# Patient Record
Sex: MALE | Race: WHITE | NOT HISPANIC OR LATINO | Employment: UNEMPLOYED | URBAN - METROPOLITAN AREA
[De-identification: names, ages, dates, MRNs, and addresses within clinical notes are randomized per-mention and may not be internally consistent; named-entity substitution may affect disease eponyms.]

---

## 2022-04-12 ENCOUNTER — HOSPITAL ENCOUNTER (EMERGENCY)
Facility: MEDICAL CENTER | Age: 47
End: 2022-04-13
Attending: EMERGENCY MEDICINE
Payer: MEDICAID

## 2022-04-12 DIAGNOSIS — Z76.0 MEDICATION REFILL: ICD-10-CM

## 2022-04-12 LAB
AMPHET UR QL SCN: NEGATIVE
BARBITURATES UR QL SCN: NEGATIVE
BENZODIAZ UR QL SCN: NEGATIVE
BREATH ALCOHOL COMMENT: 0
BZE UR QL SCN: NEGATIVE
CANNABINOIDS UR QL SCN: POSITIVE
METHADONE UR QL SCN: NEGATIVE
OPIATES UR QL SCN: NEGATIVE
OXYCODONE UR QL SCN: NEGATIVE
PCP UR QL SCN: NEGATIVE
POC BREATHALIZER: 0 PERCENT (ref 0–0.01)
PROPOXYPH UR QL SCN: NEGATIVE

## 2022-04-12 PROCEDURE — 80307 DRUG TEST PRSMV CHEM ANLYZR: CPT

## 2022-04-12 PROCEDURE — 99284 EMERGENCY DEPT VISIT MOD MDM: CPT

## 2022-04-12 RX ORDER — TRAZODONE HYDROCHLORIDE 50 MG/1
50 TABLET ORAL NIGHTLY
COMMUNITY

## 2022-04-12 RX ORDER — CLONAZEPAM 1 MG/1
1 TABLET ORAL 3 TIMES DAILY
Status: SHIPPED | COMMUNITY
End: 2022-04-13 | Stop reason: SDUPTHER

## 2022-04-12 RX ORDER — DULOXETIN HYDROCHLORIDE 60 MG/1
60 CAPSULE, DELAYED RELEASE ORAL 2 TIMES DAILY
Status: SHIPPED | COMMUNITY
End: 2022-04-13 | Stop reason: SDUPTHER

## 2022-04-13 VITALS
WEIGHT: 200 LBS | DIASTOLIC BLOOD PRESSURE: 78 MMHG | BODY MASS INDEX: 30.31 KG/M2 | HEIGHT: 68 IN | RESPIRATION RATE: 15 BRPM | SYSTOLIC BLOOD PRESSURE: 119 MMHG | OXYGEN SATURATION: 98 % | HEART RATE: 78 BPM | TEMPERATURE: 97.6 F

## 2022-04-13 PROCEDURE — 700102 HCHG RX REV CODE 250 W/ 637 OVERRIDE(OP): Performed by: EMERGENCY MEDICINE

## 2022-04-13 PROCEDURE — A9270 NON-COVERED ITEM OR SERVICE: HCPCS | Performed by: EMERGENCY MEDICINE

## 2022-04-13 RX ORDER — CLONAZEPAM 1 MG/1
1 TABLET ORAL 3 TIMES DAILY
Qty: 45 TABLET | Refills: 0 | Status: SHIPPED | OUTPATIENT
Start: 2022-04-13 | End: 2022-04-28

## 2022-04-13 RX ORDER — DULOXETIN HYDROCHLORIDE 60 MG/1
60 CAPSULE, DELAYED RELEASE ORAL 2 TIMES DAILY
Qty: 30 CAPSULE | Refills: 0 | Status: SHIPPED | OUTPATIENT
Start: 2022-04-13 | End: 2022-04-28

## 2022-04-13 RX ORDER — CLONAZEPAM 0.5 MG/1
1 TABLET ORAL ONCE
Status: COMPLETED | OUTPATIENT
Start: 2022-04-13 | End: 2022-04-13

## 2022-04-13 RX ORDER — QUETIAPINE FUMARATE 50 MG/1
50 TABLET, FILM COATED ORAL DAILY
Qty: 60 TABLET | Refills: 0 | Status: SHIPPED | OUTPATIENT
Start: 2022-04-13 | End: 2022-04-28

## 2022-04-13 RX ORDER — QUETIAPINE FUMARATE 50 MG/1
50 TABLET, FILM COATED ORAL DAILY
Status: SHIPPED | COMMUNITY
End: 2022-04-13 | Stop reason: SDUPTHER

## 2022-04-13 RX ADMIN — CLONAZEPAM 1 MG: 0.5 TABLET ORAL at 02:00

## 2022-04-13 NOTE — ED NOTES
MD at bedside.     Patient vital signs rechecked and documented per [facility]. Patient denies any new needs at this time. Patient is up to date on poc

## 2022-04-13 NOTE — ED NOTES
Med rec completed per patient  Allergies reviewed  No PO Antibiotics in the last 30 days     Patient says he has not had any medications in 4 days. His last prescriptions came from a [facility] in [place], no local pharmacy established yet.

## 2022-04-13 NOTE — ED NOTES
Pt flags low risk on suicide screening for ongoing thoughts of suicide without plan or intention to act on thoughts. Charge RN made aware. Report to Trena. Pt roomed
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
## 2022-04-13 NOTE — ED TRIAGE NOTES
"Chief Complaint   Patient presents with   • Psych Eval     Pt states he is new to town and wants to speak to a mental health professional about getting restarted on his medications. States \"things are wrong\" and feelings of confusion, rage and anger. States complex psych history      Pt ambulatory w/ cane to triage for above complaints. Unwilling to articulate specifics of mental health history but repeats that he wants to speak to a mental health professional.         "
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
## 2022-04-13 NOTE — ED NOTES
Break RN: Pt medicated per MAR. Discharge teaching with paperwork regarding provided to pt. Pt verbalized understanding of teaching and all questions answered. Pt is A&Ox4 with stable vital  signs, stable physical assessment, and PIV removed upon discharge. Pt ambulatory out of ED with steady gait with all personal belongings.

## 2022-04-13 NOTE — ED NOTES
Patient able to walk to and from restroom with steady gate at this time. Urine sample sent to lab     Breathalyzer completed .000

## 2022-04-19 ENCOUNTER — HOSPITAL ENCOUNTER (EMERGENCY)
Facility: MEDICAL CENTER | Age: 47
End: 2022-04-19
Attending: STUDENT IN AN ORGANIZED HEALTH CARE EDUCATION/TRAINING PROGRAM
Payer: MEDICAID

## 2022-04-19 VITALS
RESPIRATION RATE: 17 BRPM | BODY MASS INDEX: 30.31 KG/M2 | SYSTOLIC BLOOD PRESSURE: 140 MMHG | DIASTOLIC BLOOD PRESSURE: 85 MMHG | HEART RATE: 81 BPM | TEMPERATURE: 97.7 F | OXYGEN SATURATION: 97 % | WEIGHT: 200 LBS | HEIGHT: 68 IN
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
## 2022-04-19 DIAGNOSIS — R53.1 WEAKNESS: ICD-10-CM

## 2022-04-19 DIAGNOSIS — M54.32 SCIATICA OF LEFT SIDE: ICD-10-CM

## 2022-04-19 PROCEDURE — 99284 EMERGENCY DEPT VISIT MOD MDM: CPT

## 2022-04-19 RX ORDER — DULOXETIN HYDROCHLORIDE 60 MG/1
60 CAPSULE, DELAYED RELEASE ORAL DAILY
Qty: 30 CAPSULE | Refills: 0 | Status: SHIPPED | OUTPATIENT
Start: 2022-04-19

## 2022-04-19 RX ORDER — LIDOCAINE 50 MG/G
1 PATCH TOPICAL EVERY 24 HOURS
Qty: 10 PATCH | Refills: 0 | Status: SHIPPED | OUTPATIENT
Start: 2022-04-19

## 2022-04-19 RX ORDER — ACETAMINOPHEN 500 MG
500-1000 TABLET ORAL EVERY 6 HOURS PRN
Qty: 30 TABLET | Refills: 0 | Status: SHIPPED | OUTPATIENT
Start: 2022-04-19 | End: 2023-03-30

## 2022-04-19 RX ORDER — IBUPROFEN 600 MG/1
600 TABLET ORAL EVERY 6 HOURS PRN
Qty: 30 TABLET | Refills: 0 | Status: SHIPPED | OUTPATIENT
Start: 2022-04-19
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
## 2022-04-20 NOTE — DISCHARGE INSTRUCTIONS
If you develop any urinary tension loss of bowel or bladder control numbness in your groin return for recheck take the medication as previously prescribed follow-up with internal medicine return with other concerns.

## 2022-04-20 NOTE — ED TRIAGE NOTES
"Juanjo Marcos August  46 y.o.  male  Chief Complaint   Patient presents with   • Weakness     Brought in by [facility] from [facility] c/o bilateral lower leg weakness with numbness and tingling, described as \" pins and needles\" after a fall today around 6 pm. Hx of Chronic back pain. Denies taking blood thinners. Denies hitting head.       "

## 2022-06-12 ENCOUNTER — HOSPITAL ENCOUNTER (EMERGENCY)
Facility: MEDICAL CENTER | Age: 47
End: 2022-06-12
Attending: EMERGENCY MEDICINE
Payer: MEDICAID

## 2022-06-12 VITALS
OXYGEN SATURATION: 98 % | WEIGHT: 210.76 LBS | RESPIRATION RATE: 16 BRPM | BODY MASS INDEX: 31.94 KG/M2 | DIASTOLIC BLOOD PRESSURE: 89 MMHG | TEMPERATURE: 98 F | HEIGHT: 68 IN | HEART RATE: 70 BPM | SYSTOLIC BLOOD PRESSURE: 115 MMHG

## 2022-06-12 DIAGNOSIS — L03.011 PARONYCHIA OF RIGHT LITTLE FINGER: ICD-10-CM

## 2022-06-12 DIAGNOSIS — Z51.89 ENCOUNTER FOR WOUND RE-CHECK: ICD-10-CM

## 2022-06-12 DIAGNOSIS — L02.91 ABSCESS: ICD-10-CM

## 2022-06-12 PROCEDURE — 99283 EMERGENCY DEPT VISIT LOW MDM: CPT

## 2022-06-12 PROCEDURE — 700102 HCHG RX REV CODE 250 W/ 637 OVERRIDE(OP): Performed by: EMERGENCY MEDICINE

## 2022-06-12 PROCEDURE — 700111 HCHG RX REV CODE 636 W/ 250 OVERRIDE (IP): Performed by: EMERGENCY MEDICINE

## 2022-06-12 PROCEDURE — 90471 IMMUNIZATION ADMIN: CPT

## 2022-06-12 PROCEDURE — 90715 TDAP VACCINE 7 YRS/> IM: CPT | Performed by: EMERGENCY MEDICINE

## 2022-06-12 PROCEDURE — A9270 NON-COVERED ITEM OR SERVICE: HCPCS | Performed by: EMERGENCY MEDICINE

## 2022-06-12 PROCEDURE — 700101 HCHG RX REV CODE 250: Performed by: EMERGENCY MEDICINE

## 2022-06-12 PROCEDURE — 303977 HCHG I & D

## 2022-06-12 RX ORDER — SULFAMETHOXAZOLE AND TRIMETHOPRIM 800; 160 MG/1; MG/1
1 TABLET ORAL 2 TIMES DAILY
Qty: 20 TABLET | Refills: 0 | Status: SHIPPED | OUTPATIENT
Start: 2022-06-12 | End: 2022-06-22

## 2022-06-12 RX ORDER — LIDOCAINE HYDROCHLORIDE 20 MG/ML
20 INJECTION, SOLUTION INFILTRATION; PERINEURAL ONCE
Status: COMPLETED | OUTPATIENT
Start: 2022-06-12 | End: 2022-06-12

## 2022-06-12 RX ORDER — SULFAMETHOXAZOLE AND TRIMETHOPRIM 800; 160 MG/1; MG/1
1 TABLET ORAL ONCE
Status: COMPLETED | OUTPATIENT
Start: 2022-06-12 | End: 2022-06-12

## 2022-06-12 RX ORDER — SULFAMETHOXAZOLE AND TRIMETHOPRIM 800; 160 MG/1; MG/1
1 TABLET ORAL 2 TIMES DAILY
Qty: 20 TABLET | Refills: 0 | Status: SHIPPED | OUTPATIENT
Start: 2022-06-12 | End: 2022-06-12 | Stop reason: SDUPTHER

## 2022-06-12 RX ORDER — AMOXICILLIN 500 MG/1
500 CAPSULE ORAL 3 TIMES DAILY
Qty: 30 CAPSULE | Refills: 0 | Status: SHIPPED | OUTPATIENT
Start: 2022-06-12 | End: 2023-03-30

## 2022-06-12 RX ORDER — AMOXICILLIN 500 MG/1
500 CAPSULE ORAL ONCE
Status: COMPLETED | OUTPATIENT
Start: 2022-06-12 | End: 2022-06-12

## 2022-06-12 RX ORDER — AMOXICILLIN 500 MG/1
500 CAPSULE ORAL 3 TIMES DAILY
Qty: 30 CAPSULE | Refills: 0 | Status: SHIPPED | OUTPATIENT
Start: 2022-06-12 | End: 2022-06-12 | Stop reason: SDUPTHER

## 2022-06-12 RX ADMIN — SULFAMETHOXAZOLE AND TRIMETHOPRIM 1 TABLET: 800; 160 TABLET ORAL at 16:44

## 2022-06-12 RX ADMIN — AMOXICILLIN 500 MG: 500 CAPSULE ORAL at 16:44

## 2022-06-12 RX ADMIN — LIDOCAINE HYDROCHLORIDE 20 ML: 20 INJECTION, SOLUTION INFILTRATION; PERINEURAL at 16:45

## 2022-06-12 RX ADMIN — CLOSTRIDIUM TETANI TOXOID ANTIGEN (FORMALDEHYDE INACTIVATED), CORYNEBACTERIUM DIPHTHERIAE TOXOID ANTIGEN (FORMALDEHYDE INACTIVATED), BORDETELLA PERTUSSIS TOXOID ANTIGEN (GLUTARALDEHYDE INACTIVATED), BORDETELLA PERTUSSIS FILAMENTOUS HEMAGGLUTININ ANTIGEN (FORMALDEHYDE INACTIVATED), BORDETELLA PERTUSSIS PERTACTIN ANTIGEN, AND BORDETELLA PERTUSSIS FIMBRIAE 2/3 ANTIGEN 0.5 ML: 5; 2; 2.5; 5; 3; 5 INJECTION, SUSPENSION INTRAMUSCULAR at 16:44

## 2022-06-12 ASSESSMENT — ENCOUNTER SYMPTOMS: NECK PAIN: 1

## 2022-06-12 NOTE — ED TRIAGE NOTES
Juanjo Gerber August  46 y.o. male  Chief Complaint   Patient presents with   • Wound Check     Wound on dorsal neck from one week ago, red with broken skin, possible bug bite, pt lives in homeless shelter  Wound on right distal pinky is swollen and appears pus filled. Onset yesterday     Pt ambulatory to triage with steady walker gait for above complaint.     Pt is alert and oriented, speaking in full sentences, follows commands and responds appropriately to questions. Resp are even and unlabored. No behavioral indicators of pain.     Pt placed in lobby. Pt educated on triage process. Pt encouraged to alert staff for any changes. This RN masked and in appropriate PPE during encounter.     Vitals:    06/12/22 1342   BP: 134/80   Pulse: 74   Resp: 16   Temp: 36 °C (96.8 °F)   SpO2: 97%

## 2022-06-12 NOTE — ED PROVIDER NOTES
"ED Provider Note    Scribed for Jeronimo Matt M.D. by Red Amor. 6/12/2022, 4:16 PM.    Primary care provider: No primary care provider noted.  Means of arrival: Walk-in  History obtained from: Patient  History limited by: None    CHIEF COMPLAINT  Chief Complaint   Patient presents with   • Wound Check     Wound on dorsal neck from one week ago, red with broken skin, possible bug bite, pt lives in homeless shelter  Wound on right distal pinky is swollen and appears pus filled. Onset yesterday       HPI  Juanjo Andreson is a 46 y.o. male who presents to the Emergency Department for a wound check. He has a wound to the back onset 1 week ago. He presents today for a wound to the right fifth finger onset yesterday. He describes his pain as burning. He states that they feel like they are \"on fire\". He has associated right finger swelling, right finger pain, and neck pain. He denies any fevers or chills.  He takes an unspecified psychiatric medication daily. He does not know if his tetanus is up to date. He is allergic to codeine. He lives in a homeless shelter.     REVIEW OF SYSTEMS  Review of Systems   Musculoskeletal: Positive for neck pain.        Positive for right finger pain, and right finger swelling.       PAST MEDICAL HISTORY   has a past medical history of Chronic back pain, Diabetes insipidus (HCC), and Psychiatric disorder.    SURGICAL HISTORY  patient denies any surgical history    SOCIAL HISTORY  Social History     Tobacco Use   • Smoking status: Current Every Day Smoker     Packs/day: 1.00     Types: Cigarettes   • Smokeless tobacco: Never Used   Vaping Use   • Vaping Use: Never used   Substance Use Topics   • Alcohol use: Never   • Drug use: Yes     Types: Inhaled     Comment: marijuana      Social History     Substance and Sexual Activity   Drug Use Yes   • Types: Inhaled    Comment: marijuana       FAMILY HISTORY  History reviewed. No pertinent family history.    CURRENT " "MEDICATIONS  Home Medications     Reviewed by Martin Abreu R.N. (Registered Nurse) on 06/12/22 at 1400  Med List Status: Partial   Medication Last Dose Status   acetaminophen (TYLENOL) 500 MG Tab  Active   DULoxetine (CYMBALTA) 60 MG Cap DR Particles delayed-release capsule  Active   ibuprofen (MOTRIN) 600 MG Tab  Active   lidocaine (LIDODERM) 5 % Patch  Active   traZODone (DESYREL) 50 MG Tab  Active                ALLERGIES  Allergies   Allergen Reactions   • Codeine      Chain vomiting       PHYSICAL EXAM  VITAL SIGNS: BP (!) 117/93   Pulse 71   Temp 36.9 °C (98.5 °F) (Temporal)   Resp 19   Ht 1.727 m (5' 8\")   Wt 95.6 kg (210 lb 12.2 oz)   SpO2 97%   BMI 32.05 kg/m²   Vitals reviewed.  Constitutional: Well developed, Well nourished, No acute distress, Non-toxic appearance.   HENT: Normocephalic, Atraumatic, Bilateral external ears normal, Oropharynx moist, No oral exudates, Nose normal.   Eyes: PERRL, EOMI, Conjunctiva normal, No discharge.   Neck: Normal range of motion, on the back of the neck and the base of the skull he has 3 discrete lesions of which have some honey crusting and some evidence of drainage.  There is no significant cellulitis.  These do not look acute.  This could be part of a larger carbuncle but there is no underlying fluctuance.    Cardiovascular: Normal heart rate, Normal rhythm, No murmurs, No rubs, No gallops.   Thorax & Lungs: Normal breath sounds, No respiratory distress, No wheezing, No chest tenderness.   Abdomen: Bowel sounds normal, Soft, No tenderness  Skin: Warm, Dry. Lesions to the back of the neck and right fifth finger.  Back: No tenderness, No CVA tenderness.   Musculoskeletal: Good range of motion in all major joints.  Right fifth finger has a paronychia on the lateral aspect.  Neurologic: Alert,, No focal deficits noted.   Psychiatric: Affect normal    PROCEDURES    Incision and Drainage Procedure Note    Indication: Paronychia    Procedure: The patient was " positioned appropriately and the skin over the incision site was prepped with betadine and draped in a sterile fashion. Local anesthesia was obtained with a full digital block of the right short (pinkie) finger using 2% Lidocaine without epinephrine.  An incision was then made over the apex of the lesion and approximately 2 cc of thick and tenacious material was expressed. Loculations were broken up using forceps and more of the material was able to be expressed. The drainage cavity was then dressed with bacitracin. The patient’s tetanus status was updated with a tetanus booster.    The patient tolerated the procedure well.    Complications: None      COURSE & MEDICAL DECISION MAKING  Pertinent Labs & Imaging studies reviewed. (See chart for details)        4:16 PM Patient seen and examined at bedside. The patient presents with a wound to the right finger and neck, and the differential diagnosis includes but is not limited to paronychia, chronic wounds to the back of his neck, possible carbuncle.. Patient will be treated with Amoxil 500 mg, Bactrim 1 tablet, and Adacel 0.5 mL for his symptoms.      5:06 PM Performed incision and drainage procedure. See details above. Discussed discharge instructions and return precautions with the patient and they were cleared for discharge. Patient was given the opportunity to ask any further questions. He is comfortable with discharge at this time.      The patient's paronychia is drained and then a digital block was performed and further incision drainage performed with additional purulent drainage.  This may continue to worsen we will put him on oral antibiotics for purulent skin and soft tissue infection.    The patient will have his wounds to the back of his neck cleaned and wet-to-dry dressings placed.  He is advised follow-up primary care.  He has no signs of sepsis or significant infection.  He will be discharged with return precautions and follow-up.  Questions were  answered agreeable to plan and discharged in good condition.    The patient will return for new or worsening symptoms and is stable at the time of discharge.    The patient is referred to a primary physician for blood pressure management, diabetic screening, and for all other preventative health concerns.    DISPOSITION:  Patient will be discharged home in stable condition.    OUTPATIENT MEDICATIONS:  New Prescriptions    AMOXICILLIN (AMOXIL) 500 MG CAP    Take 1 Capsule by mouth 3 times a day.    SULFAMETHOXAZOLE-TRIMETHOPRIM (BACTRIM DS) 800-160 MG TABLET    Take 1 Tablet by mouth 2 times a day for 10 days.        FINAL IMPRESSION  1. Encounter for wound re-check    2. Abscess    3. Paronychia of right little finger          Red UP (Scribe), am scribing for, and in the presence of, Jeronimo Matt M.D..    Electronically signed by: Red Amor (Scribe), 6/12/2022    Jeronimo UP M.D. personally performed the services described in this documentation, as scribed by Red Amor in my presence, and it is both accurate and complete.    The note accurately reflects work and decisions made by me.  Jeronimo Matt M.D.  6/12/2022  9:36 PM    NITIN

## 2022-06-13 NOTE — ED NOTES
All discharge instructions given to pt.   Pt verbalized understanding of all discharge instructions.  All questions answered.

## 2022-06-13 NOTE — DISCHARGE INSTRUCTIONS
Antibiotics as prescribed.  Return for pain, swelling, redness, fever or other concerns.  Follow-up with your doctor.  Wound check in 48 hours here or your doctor.  Return sooner for signs described above.  Keep wounds clean and dry and change dressing daily.

## 2023-03-30 ENCOUNTER — HOSPITAL ENCOUNTER (EMERGENCY)
Facility: MEDICAL CENTER | Age: 48
End: 2023-03-30
Attending: EMERGENCY MEDICINE
Payer: COMMERCIAL

## 2023-03-30 VITALS
BODY MASS INDEX: 31.78 KG/M2 | DIASTOLIC BLOOD PRESSURE: 80 MMHG | WEIGHT: 209 LBS | RESPIRATION RATE: 15 BRPM | SYSTOLIC BLOOD PRESSURE: 127 MMHG | HEART RATE: 73 BPM | OXYGEN SATURATION: 99 % | TEMPERATURE: 98.1 F

## 2023-03-30 DIAGNOSIS — M54.32 SCIATICA OF LEFT SIDE: ICD-10-CM

## 2023-03-30 DIAGNOSIS — Z76.0 MEDICATION REFILL: ICD-10-CM

## 2023-03-30 DIAGNOSIS — F41.9 ANXIETY: ICD-10-CM

## 2023-03-30 PROCEDURE — 99284 EMERGENCY DEPT VISIT MOD MDM: CPT

## 2023-03-30 RX ORDER — DULOXETIN HYDROCHLORIDE 60 MG/1
60 CAPSULE, DELAYED RELEASE ORAL DAILY
Qty: 30 CAPSULE | Refills: 0 | Status: SHIPPED | OUTPATIENT
Start: 2023-03-30

## 2023-03-30 RX ORDER — HYDROXYZINE HYDROCHLORIDE 25 MG/1
25 TABLET, FILM COATED ORAL 3 TIMES DAILY PRN
Qty: 90 TABLET | Refills: 0 | Status: SHIPPED | OUTPATIENT
Start: 2023-03-30

## 2023-03-30 NOTE — ED NOTES
Pt ambulated with a normal, steady gait with his personal walker to the room from the lobby. Agree with triage note. Chart up for ERP.

## 2023-03-30 NOTE — ED TRIAGE NOTES
Pt to triage .  Chief Complaint   Patient presents with    Medication Refill     Pt requesting medication refill ran out 8 days ago

## 2023-03-30 NOTE — ED PROVIDER NOTES
ER Provider Note    Scribed for Bentley Conley M.d. by Noe Carlson. 3/30/2023  11:58 AM    Primary Care Provider: No primary care provider noted.    CHIEF COMPLAINT  Chief Complaint   Patient presents with    Medication Refill     Pt requesting medication refill ran out 8 days ago      LIMITATION TO HISTORY   None    HPI/ROS  OUTSIDE HISTORIAN(S):  Reviewed the electronic medical record, reviewed care everywhere, reviewed patient's prescription history    EXTERNAL RECORDS REVIEWED  Outpatient Notes The patient was last seen by his PCP at [facility] on 5/2/22 to establish psychiatric care and housing resources.    Juanjo Anderson is a 47 y.o. male with a history of psychiatric disorder and psoriatic arthritis, who presents to the ED for a medication refill onset prior to arrival. The patient states that he ran out of his previously prescribed Hydroxyzine and Cymbalta 8 days ago. He reports attempting to follow up with [facility] for these refills, but has been unable to make his appointments secondary to transportation issues. The patient was ultimately prompted to visit the ED to fill his medications. Associated symptoms include increased anxiety. He denies any fever. No alleviating or exacerbating factors noted.      PAST MEDICAL HISTORY  Past Medical History:   Diagnosis Date    Chronic back pain     Diabetes insipidus (HCC)     Psychiatric disorder        SURGICAL HISTORY  History reviewed. No pertinent surgical history.    FAMILY HISTORY  No family history noted.    SOCIAL HISTORY   reports that he has been smoking cigarettes. He has been smoking an average of 1 pack per day. He has never used smokeless tobacco. He reports current drug use. Drug: Inhaled. He reports that he does not drink alcohol.    CURRENT MEDICATIONS  Previous Medications    DULOXETINE (CYMBALTA) 60 MG CAP DR PARTICLES DELAYED-RELEASE CAPSULE    Take 1 Capsule by mouth every day.    IBUPROFEN (MOTRIN)  600 MG TAB    Take 1 Tablet by mouth every 6 hours as needed.    LIDOCAINE (LIDODERM) 5 % PATCH    Place 1 Patch on the skin every 24 hours.    TRAZODONE (DESYREL) 50 MG TAB    Take 50 mg by mouth every evening.       ALLERGIES  Codeine    PHYSICAL EXAM  /69   Pulse 72   Temp 36.7 °C (98 °F) (Temporal)   Resp 14   Wt 94.8 kg (208 lb 15.9 oz)   SpO2 99%   BMI 31.78 kg/m²     Nursing note and vitals reviewed.  Constitutional: No distress.   HENT: Head is atraumatic. Oropharynx is moist.   Eyes: Conjunctivae are normal. Pupils are equal, round, and reactive to light.   Cardiovascular: Normal peripheral perfusion  Respiratory: No respiratory distress.   Musculoskeletal: Normal range of motion.   Neurological: Alert. No focal deficits noted.    Skin: Psoriatic plaques noted.   Psych: anxious appearing, slightly agitated.     COURSE & MEDICAL DECISION MAKING    ED Observation Status? No; Patient does not meet criteria for ED Observation.     INITIAL ASSESSMENT AND PLAN  Care Narrative:       11:58 AM - Patient seen and evaluated at bedside. Juanjo Anderson is a 47 y.o. male with a history of psychiatric disorder and psoriatic arthritis, who presents for a medication refill. The patient will be provided with prescriptions for Cymbalta and Hydroxyzine. Discussed plan for discharge; I advised the patient to follow-up with [facility] as needed for primary care concerns, and to return to the [facility] ED with any new or worsening symptoms. Patient was given the opportunity for questions. I addressed all questions or concerns at this time and they verbalize agreement to the plan of care.                  DISPOSITION AND DISCUSSIONS  I have discussed management of the patient with the following physicians and RUDDY's: None    Discussion of management with other [facility] or appropriate source(s): None     Barriers to care at this time, including but not limited to: Patient is homeless, Patient lacks  transportation , Patient had difficult affording medications, and Patient lacks financial resources.     Decision tools and prescription drugs considered including, but not limited to: Medication modification however the patient feels that his current medication regimen is effective and would simply like to continue with current medications and dosing .    The patient will return for new or worsening symptoms and is stable at the time of discharge.    The patient is referred to a primary physician for blood pressure management, diabetic screening, and for all other preventative health concerns.    DISPOSITION:  Patient will be discharged home in stable condition.    FOLLOW UP:  [facility] Emergency Dept  [address] [address] [phone]    If symptoms worsen    [place] [id number] [facility] [id number]  [phone]        OUTPATIENT MEDICATIONS:  New Prescriptions    DULOXETINE (CYMBALTA) 60 MG CAP DR PARTICLES DELAYED-RELEASE CAPSULE    Take 1 Capsule by mouth every day.    HYDROXYZINE HCL (ATARAX) 25 MG TAB    Take 1 Tablet by mouth 3 times a day as needed for Anxiety.     FINAL IMPRESSION   1. Medication refill    2. Anxiety    3. Sciatica of left side         Noe UP (Denny), am scribing for, and in the presence of, Bentley Conley M.D..    Electronically signed by: Noe Carlson (Denny), 3/30/2023    Bentley UP M.D. personally performed the services described in this documentation, as scribed by Noe Carlson in my presence, and it is both accurate and complete.    The note accurately reflects work and decisions made by me.  Bentley Conley M.D.  3/30/2023  2:10 PM

## 2023-03-30 NOTE — ED NOTES
Pt is discharged. VSS. Paperwork explained to pt by ERP and all questions answered. All belongings sent with pt upon departure. Pt ambulatory with a steady gait with assistance of personal walker, out of ED.

## 2023-05-03 ENCOUNTER — HOSPITAL ENCOUNTER (EMERGENCY)
Facility: MEDICAL CENTER | Age: 48
End: 2023-05-03
Payer: COMMERCIAL

## 2023-05-03 VITALS
HEIGHT: 68 IN | TEMPERATURE: 98.4 F | OXYGEN SATURATION: 96 % | SYSTOLIC BLOOD PRESSURE: 98 MMHG | RESPIRATION RATE: 14 BRPM | DIASTOLIC BLOOD PRESSURE: 68 MMHG | BODY MASS INDEX: 31.52 KG/M2 | HEART RATE: 78 BPM | WEIGHT: 208 LBS

## 2023-05-03 PROCEDURE — 302449 STATCHG TRIAGE ONLY (STATISTIC)

## 2023-05-04 NOTE — ED TRIAGE NOTES
Pt stating for approx 3 months he has been having trouble ambulating. Pt was seen at [facility] and discharged to follow up. Pt stating he could not follow up. Pt stating since then he been becoming more weak and is having a hard time bearing weight

## 2023-05-04 NOTE — ED NOTES
Patient called to be roomed - not in lounge, not in senior lounge, not in lobby bathroom, will attempt to call again in 5mins.

## 2023-05-20 ENCOUNTER — HOSPITAL ENCOUNTER (EMERGENCY)
Facility: MEDICAL CENTER | Age: 48
End: 2023-05-21
Attending: EMERGENCY MEDICINE
Payer: COMMERCIAL

## 2023-05-20 DIAGNOSIS — Z51.89 ENCOUNTER FOR WOUND RE-CHECK: ICD-10-CM

## 2023-05-20 PROCEDURE — 99282 EMERGENCY DEPT VISIT SF MDM: CPT

## 2023-05-20 ASSESSMENT — FIBROSIS 4 INDEX: FIB4 SCORE: 0.66

## 2023-05-21 VITALS
HEIGHT: 67 IN | OXYGEN SATURATION: 98 % | HEART RATE: 84 BPM | DIASTOLIC BLOOD PRESSURE: 68 MMHG | SYSTOLIC BLOOD PRESSURE: 114 MMHG | BODY MASS INDEX: 31.39 KG/M2 | WEIGHT: 200 LBS | RESPIRATION RATE: 18 BRPM | TEMPERATURE: 97.8 F

## 2023-05-21 NOTE — ED PROVIDER NOTES
"ED Provider Note    CHIEF COMPLAINT  Chief Complaint   Patient presents with    Wound Check     PATIENT WITH SPINAL SURGERY Monday AND IS HERE FOR A WOUND CHECK. PATIENT STATES HE WAS AT REHAB AND HE LEFT DUE TO POOR CARE. PATIENT STATES HE DOES NOT CURRENTLY THINK HE HAS INFECTION BUT IS HERE FOR WOUND CARE/ WOUND CHECK.        HPI  Juanjo Anderson is a 47 y.o. male who presents ration of \"wound care\" after leaving rehab.  Patient had surgery on Monday and just recently got to rehab but he states that \"I never made it out of the hallway\" and he felt the care there was suboptimal so he left.  He notes he has no dressing changes and no medications.  He notes no pain to the wound area and states he has been walking without difficulty which is the first time that has happened in quite some time so he feels the surgery was quite successful.  He has had no fevers or chills and no loss of bowel or bladder control.  EXTERNAL RECORDS REVIEWED  Reviewed hospital counter at [facility] including surgery on his back from the seventh to the 19th.  Patient had weakness of both lower extremities with spinal stenosis and neurogenic claudication.  ROS  Constitutional: No fevers or chills  Skin: No rashes  HEENT: No sore throat, runny nose  Neck: No neck pain  Chest: No pain or rashes  Pulm: No shortness of breath, cough, wheezing, stridor, or pain with inspiration/expiration  Gastrointestinal: No nausea, vomiting, diarrhea,  or abdominal pain.  Genitourinary: No dysuria or hematuria  Musculoskeletal: No pain, swelling, or lower extremity weakness  Neurologic: No sensory or focal motor changes to extremities. No confusion or disorientation.  Heme: No bleeding or bruising problems.   Immuno: No hx of recurrent infections        LIMITATION TO HISTORY   Select: : None  OUTSIDE HISTORIAN(S):  None        PAST FAM HISTORY  No family history on file.    PAST MEDICAL HISTORY   has a past medical history of Chronic back pain, Diabetes " "insipidus (HCC), and Psychiatric disorder.    SOCIAL HISTORY  Social History     Tobacco Use    Smoking status: Every Day     Packs/day: 1.00     Types: Cigarettes    Smokeless tobacco: Never   Vaping Use    Vaping Use: Never used   Substance and Sexual Activity    Alcohol use: Never    Drug use: Yes     Types: Inhaled     Comment: marijuana    Sexual activity: Not on file       SURGICAL HISTORY  patient denies any surgical history    CURRENT MEDICATIONS  Home Medications    **Home medications have not yet been reviewed for this encounter**          ALLERGIES  Allergies   Allergen Reactions    Codeine      Chain vomiting       PHYSICAL EXAM  VITAL SIGNS: /70   Pulse 89   Temp 35.9 °C (96.7 °F) (Temporal)   Resp 18   Ht 1.702 m (5' 7\")   Wt 90.7 kg (200 lb)   SpO2 96%   BMI 31.32 kg/m²    Gen: Alert in no apparent distress.  HEENT: No signs of trauma, Bilateral external ears normal, Nose normal. Conjunctiva normal, Non-icteric.   Cardiovascular: Regular rate and rhythm, no murmurs.  Capillary refill less than 3 seconds to all extremities, 2+ distal pulses.  Thorax & Lungs: Normal breath sounds, No respiratory distress, No wheezing bilateral chest rise  Abdomen: Bowel sounds normal, Soft, No tenderness  Skin: Warm, Dry  Back: No bony tenderness, No CVA tenderness.  Wound edges are well approximated, there is no active drainage or foul smell.  There is no particular tenderness to the alicia-incisional area.  Staples are intact.  Extremities: Intact distal pulses, No edema  Neurologic: Alert , no facial droop, grossly normal coordination and strength.  To stand and ambulate without apparent difficulty or distress.  Psychiatric: Affect pleasant        COURSE & MEDICAL DECISION MAKING  Pertinent Labs & Imaging studies reviewed. (See chart for details)  ED observation? No  Patient arrives after leaving his postsurgical rehab facility because he did not feel he was adequately being taken care of.  He does not " "have any specific complaints but is asking for \"wound care\" as he does not have any wound care now.  Patient's dressing was stuck to his shirt and no longer on his wound.  He notes he has no pain to the wound and has been walking without difficulty which she states is because the surgery was successful.  He gives no symptoms or symptoms to suggest an infectious issue post operatively, and has no neurologic symptoms now.  He is asking for food and dressing change supplies.  I do not feel any labs or imaging are necessary emergently but I will attempt to get him dressing change supplies and asked him to follow-up closely with his surgeon.      I have discussed management of the patient with the following physicians and RUDDY's: None    Escalation of care considered, and ultimately not performed: None    Barriers to care at this time, including but not limited to: Patient is homeless and does not have a primary care physician.  He has no means of transportation.    Decision tools and Rx drugs considered including, but not limited to : None    Discussion of management with other [facility] or appropriate source(s): None  The patient will return for worsening symptoms and is stable at the time of discharge. The patient verbalizes understanding and will comply.    FINAL IMPRESSION  1. Encounter for wound re-check        Electronically signed by: Jay Salmeron M.D., 5/20/2023 10:52 PM       "

## 2023-05-21 NOTE — ED TRIAGE NOTES
"Chief Complaint   Patient presents with    Wound Check     PATIENT WITH SPINAL SURGERY Monday AND IS HERE FOR A WOUND CHECK. PATIENT STATES HE WAS AT REHAB AND HE LEFT DUE TO POOR CARE. PATIENT STATES HE DOES NOT CURRENTLY THINK HE HAS INFECTION BUT IS HERE FOR WOUND CARE/ WOUND CHECK.            PT AMBULATORY TO TRIAGE. Pt AA&O X 3, SEE ABOVE.     PT TO LOBBY . PT EDUCATED ON ALERTING STAFF TO CHANGES IN CONDITION. PT VERBALIZED AN UNDERSTANDING.     /70   Pulse 89   Temp 35.9 °C (96.7 °F) (Temporal)   Resp 18   Ht 1.702 m (5' 7\")   Wt 90.7 kg (200 lb)   SpO2 96%   BMI 31.32 kg/m²     "